# Patient Record
Sex: FEMALE | Race: WHITE
[De-identification: names, ages, dates, MRNs, and addresses within clinical notes are randomized per-mention and may not be internally consistent; named-entity substitution may affect disease eponyms.]

---

## 2023-04-10 ENCOUNTER — TRANSCRIBE ORDERS (OUTPATIENT)
Dept: SCHEDULING | Age: 38
End: 2023-04-10

## 2023-04-13 ENCOUNTER — TRANSCRIBE ORDERS (OUTPATIENT)
Dept: SCHEDULING | Age: 38
End: 2023-04-13

## 2023-04-13 DIAGNOSIS — E78.5 HYPERLIPIDEMIA, UNSPECIFIED: Primary | ICD-10-CM

## 2023-04-13 DIAGNOSIS — E66.3 OVERWEIGHT: ICD-10-CM

## 2023-04-13 DIAGNOSIS — I10 ESSENTIAL (PRIMARY) HYPERTENSION: ICD-10-CM

## 2023-05-01 ENCOUNTER — HOSPITAL ENCOUNTER (OUTPATIENT)
Dept: RADIOLOGY | Age: 38
Discharge: HOME | End: 2023-05-01
Attending: FAMILY MEDICINE

## 2023-05-01 DIAGNOSIS — I10 ESSENTIAL (PRIMARY) HYPERTENSION: ICD-10-CM

## 2023-05-01 DIAGNOSIS — E78.5 HYPERLIPIDEMIA, UNSPECIFIED: ICD-10-CM

## 2023-05-01 DIAGNOSIS — E66.3 OVERWEIGHT: ICD-10-CM

## 2023-05-01 PROCEDURE — 75571 CT HRT W/O DYE W/CA TEST: CPT

## 2023-12-13 ENCOUNTER — ANNUAL EXAM (OUTPATIENT)
Dept: OBGYN CLINIC | Facility: CLINIC | Age: 38
End: 2023-12-13
Payer: COMMERCIAL

## 2023-12-13 VITALS
WEIGHT: 168.4 LBS | SYSTOLIC BLOOD PRESSURE: 116 MMHG | HEIGHT: 67 IN | BODY MASS INDEX: 26.43 KG/M2 | DIASTOLIC BLOOD PRESSURE: 78 MMHG

## 2023-12-13 DIAGNOSIS — Z12.4 SCREENING FOR CERVICAL CANCER: ICD-10-CM

## 2023-12-13 DIAGNOSIS — Z01.419 WOMEN'S ANNUAL ROUTINE GYNECOLOGICAL EXAMINATION: Primary | ICD-10-CM

## 2023-12-13 PROCEDURE — S0610 ANNUAL GYNECOLOGICAL EXAMINA: HCPCS | Performed by: STUDENT IN AN ORGANIZED HEALTH CARE EDUCATION/TRAINING PROGRAM

## 2023-12-13 RX ORDER — LOSARTAN POTASSIUM 25 MG/1
TABLET ORAL
COMMUNITY

## 2023-12-13 RX ORDER — AMLODIPINE BESYLATE 5 MG/1
5 TABLET ORAL DAILY
COMMUNITY

## 2023-12-13 NOTE — PROGRESS NOTES
[address] [place] [address], [address], [place], [address] [facility],    ASSESSMENT/PLAN: Bertha Driscoll is a 41 y.o. Joanna Baeza who presents for annual gynecologic exam. She is a new patient. Encounter for routine gynecologic examination  - Routine well woman exam completed today. - Cervical Cancer Screening: Current ASCCP Guidelines reviewed. Last Pap: 02/17/2020. History of abnormal: Yes- CIN1 2019. Repeat pap collected today. - HPV Vaccination status: Not immunized. Discussed availability of vaccination up to age 39.  - STI screening offered including HIV testing: offered, pt declined  - Contraceptive counseling discussed. Current contraception: vasectomy. Patient desires to conceive. Discussed option for reversal of vasectomy or referral to [facility]. She desires referral to [facility] for consideration of IVF. Contact information provided today. - Discussed that losartan is contraindicated in pregnancy. Recommend that she discuss alternative medication with PCP prior to conception.  - The following were reviewed in today's visit: breast self exam, exercise, and healthy diet    Additional problems addressed during this visit:  1. Women's annual routine gynecological examination    2. Screening for cervical cancer  -     IGP, Aptima HPV, Rfx 16/18,45        CC:  Annual Gynecologic Examination - New patient visit    HPI: Bertha Driscoll is a 41 y.o. Joanna Baeza who presents for annual gynecologic examination. She is without complaint today. She is interested in conception. Partner had vasectomy prior to meeting her. They desire to see [facility].    ROS: Negative except as noted in HPI    Patient's last menstrual period was 11/26/2023 (exact date). Menstrual History  Period Cycle (Days): 28  Period Pattern: Regular  Menstrual Flow: Light, Moderate, Heavy  Menstrual Control: Tampon  Menstrual Control Change Freq (Hours):  1  Dysmenorrhea: (!) Moderate  Dysmenorrhea Symptoms: Cramping    She  reports being sexually active and has had partner(s) who are male.   Sexual History  Sexual Assault: No  Sexually Transmitted Infection History: Chlamydia, HSV  Multiple Sex Partners: No  Is Patient in a Monogamous Relationship?: Yes    The following portions of the patient's history were reviewed and updated as appropriate:   Past Medical History:   Diagnosis Date    Abnormal Pap smear of cervix     Herpes     HPV (human papilloma virus) infection     Hypothyroidism     Migraine     Varicella      Past Surgical History:   Procedure Laterality Date    COLPOSCOPY       Family History   Problem Relation Age of Onset    Autoimmune disease Mother     Heart disease Father     Pancreatic cancer Father     No Known Problems Sister     Hashimoto's thyroiditis Sister     No Known Problems Sister     Cancer Maternal Grandmother         HPV    Crohn's disease Maternal Grandmother     Alzheimer's disease Maternal Grandmother     Heart attack Maternal Grandfather     Macular degeneration Paternal Grandmother     Glaucoma Paternal Grandmother     Pulmonary fibrosis Paternal Grandfather     Prostate cancer Paternal Grandfather     Skin cancer Paternal Grandfather     Breast cancer Neg Hx     Ovarian cancer Neg Hx     Colon cancer Neg Hx     Uterine cancer Neg Hx      Social History     Socioeconomic History    Marital status: /Civil Union     Spouse name: None    Number of children: None    Years of education: None    Highest education level: None   Occupational History    None   Tobacco Use    Smoking status: Never    Smokeless tobacco: Never   Vaping Use    Vaping status: Never Used   Substance and Sexual Activity    Alcohol use: Yes     Comment: occassionally    Drug use: No    Sexual activity: Yes     Partners: Male     Comment: no new partner in past year   Other Topics Concern    None   Social History Narrative    None     Social Determinants of Health     Financial Resource Strain: Not on file   Food Insecurity: Not on file   Transportation Needs: Not on file   Physical Activity: Not on file   Stress: Not on file   Social Connections: Not on file   Intimate Partner Violence: Not on file   Housing Stability: Not on file     Outpatient Medications Marked as Taking for the 12/13/23 encounter (Annual Exam) with Aracelis Banks MD   Medication    amLODIPine (NORVASC) 5 mg tablet    levothyroxine 175 mcg tablet    losartan (COZAAR) 25 mg tablet    valACYclovir (VALTREX) 500 mg tablet     Current Facility-Administered Medications for the 12/13/23 encounter (Annual Exam) with Aracelis Banks MD   Medication    [DISCONTINUED] levonorgestrel (MIRENA) IUD 20 mcg/day     No Known Allergies        Objective:  /78 (BP Location: Left arm, Patient Position: Sitting, Cuff Size: Large)   Ht 5' 7.25" (1.708 m)   Wt 76.4 kg (168 lb 6.4 oz)   LMP 11/26/2023 (Exact Date)   BMI 26.18 kg/m²        Chaperone present? Yes: Jg Trinh MA. General Appearance: alert and oriented, in no acute distress. Neck/Thyroid: No thyromegaly, no thyroid nodules. Respiratory: Unlabored breathing. Cardiovascular: Regular rate, no peripheral edema. Abdomen: Soft, non-tender, non-distended, no masses, no rebound or guarding. Breast Exam: No dimpling, nipple retraction or discharge. No lumps or masses. No axillary or supraclavicular nodes. Pelvic:       External genitalia: Normal appearance, no abnormal pigmentation, no lesions or masses. Normal [place]'s and [place]'s. Urinary system: Urethral meatus normal, bladder non-tender. Vaginal: normal mucosa without prolapse or lesions. Normal-appearing physiologic discharge. Cervix: Normal-appearing, well-epithelialized, no gross lesions or masses. No cervical motion tenderness. Adnexa: No adnexal masses or tenderness noted. Uterus: Normal-sized, regular contour, midline, mobile, no uterine tenderness. Extremities: Normal range of motion. Warm, well-perfused, non-tender.    Skin: normal, no rash or abnormalities  Neurologic: alert, oriented x3  Psychiatric: Appropriate affect, mood stable, cooperative with exam.        Sveta Linn MD  12/13/2023 10:29 AM

## 2023-12-13 NOTE — PATIENT INSTRUCTIONS
[facility]: [email or web address]    [facility]: [email or web address]    [facility]: [email or web address]

## 2023-12-20 LAB
CYTOLOGIST CVX/VAG CYTO: NORMAL
DX ICD CODE: NORMAL
HPV GENOTYPE REFLEX: NORMAL
HPV I/H RISK 4 DNA CVX QL PROBE+SIG AMP: NEGATIVE
Lab: NORMAL
OTHER STN SPEC: NORMAL
PATH REPORT.FINAL DX SPEC: NORMAL
SL AMB NOTE:: NORMAL
SL AMB SPECIMEN ADEQUACY: NORMAL
SL AMB TEST METHODOLOGY: NORMAL